# Patient Record
Sex: FEMALE | Race: BLACK OR AFRICAN AMERICAN | Employment: FULL TIME | URBAN - METROPOLITAN AREA
[De-identification: names, ages, dates, MRNs, and addresses within clinical notes are randomized per-mention and may not be internally consistent; named-entity substitution may affect disease eponyms.]

---

## 2019-02-19 ENCOUNTER — OFFICE VISIT (OUTPATIENT)
Dept: ENDOCRINOLOGY | Age: 34
End: 2019-02-19

## 2019-02-19 VITALS
HEIGHT: 62 IN | RESPIRATION RATE: 18 BRPM | TEMPERATURE: 96.6 F | BODY MASS INDEX: 38.64 KG/M2 | SYSTOLIC BLOOD PRESSURE: 151 MMHG | HEART RATE: 80 BPM | WEIGHT: 210 LBS | OXYGEN SATURATION: 96 % | DIASTOLIC BLOOD PRESSURE: 99 MMHG

## 2019-02-19 DIAGNOSIS — R63.5 WEIGHT GAIN: ICD-10-CM

## 2019-02-19 DIAGNOSIS — R73.03 PRE-DIABETES: ICD-10-CM

## 2019-02-19 DIAGNOSIS — E66.09 CLASS 2 OBESITY DUE TO EXCESS CALORIES WITHOUT SERIOUS COMORBIDITY WITH BODY MASS INDEX (BMI) OF 38.0 TO 38.9 IN ADULT: Primary | ICD-10-CM

## 2019-02-19 LAB — HBA1C MFR BLD HPLC: 5.8 %

## 2019-02-19 RX ORDER — AMLODIPINE BESYLATE 5 MG/1
5 TABLET ORAL DAILY
COMMUNITY

## 2019-02-19 RX ORDER — CITALOPRAM 10 MG/1
TABLET ORAL DAILY
COMMUNITY

## 2019-02-19 RX ORDER — ALPRAZOLAM 0.25 MG/1
TABLET ORAL
COMMUNITY

## 2019-02-19 NOTE — PATIENT INSTRUCTIONS
-------------------------------------------------------------------------------------------- Refills    -    please call your pharmacy and have them send us a refill request 
 
Results  -  allow up to a week for lab results to be processed and reviewed. Phone calls  -  Allow upto 24 hrs. for non-urgent calls to be retained Prior authorization - It may take up to 2 weeks to process, depending on your insurance Forms  -  FMLA, DMV, patient assistance, etc. will take up to 2 weeks to process Cancellations - please notify the office in advance if you cannot keep your appointment Samples  - will only be dispensed at visits as supply is limited If you are having a medical emergency call 911 
 
--------------------------------------------------------------------------------------------

## 2019-02-19 NOTE — PROGRESS NOTES
[facility] Everton Alvarez MD 
 
  
Patient Information Date:2/19/2019 Name : Jerrilyn Essex 35 y.o.    
YOB: 1985 Referred by: Perla Combs MD  
 
 
History of Present Illness: Jerrilyn Essex is a 35 y.o. female  PCOS She was seen for gestational diabetes management in the past,Delivered healthy baby in April 2015. She has been gradually gaining weight, had ultrasound of the ovaries which showed cysts and was suspected to have PCO S. 
Her cycles have always been regular in the past, no problems with fertility, now she is on Mirena, denies any excess hair growth, hair loss, acne. Complains of tiredness Family history of PCO S 
 
Grandmother had diabetes No testosterone exposure Past Medical History:  
Diagnosis Date  Essential (primary) hypertension  GDM (gestational diabetes mellitus) diet controlled  Hyperglycemia  Pre-eclampsia 3/30/2015 No Known Allergies Current Outpatient Medications Medication Sig Dispense Refill  amLODIPine (NORVASC) 5 mg tablet Take 5 mg by mouth daily.  citalopram (CELEXA) 10 mg tablet Take  by mouth daily.  ALPRAZolam (XANAX) 0.25 mg tablet Take  by mouth nightly as needed for Anxiety.  acetaminophen (TYLENOL) 325 mg tablet Take 325 mg by mouth every four (4) hours as needed for Pain. Indications: PAIN    
 loratadine (CLARITIN) 10 mg tablet Take 10 mg by mouth as needed for Allergies.  HYDROcodone-acetaminophen (NORCO) 5-325 mg per tablet Take 2 Tabs by mouth every four (4) hours as needed. Max Daily Amount: 12 Tabs. 10 Tab 0  
 PRENATAL VIT #76/IRON,CARB/FA (PNV 29-1 PO) Take 2 tablets by mouth daily.  ranitidine (ZANTAC 75) 75 mg tablet Take 75 mg by mouth as needed for Indigestion.     
 glucose blood VI test strips (ONE TOUCH ULTRA TEST) strip Test blood glucose 6-8 times daily Dx Code: 648.80 300 strip 9  
  Lancets misc Test blood glucose 6-8 times daily Dx Code: 648.80 300 each 9  
 hydrocodone-acetaminophen (VICODIN) 5-500 mg per tablet Take 1 Tab by mouth every six (6) hours as needed for Pain. 10 Tab 0 Reviewed Family Hx,Social Hx Review of Systems: 
- Constitutional Symptoms: no fevers, chills, no weight loss - Eyes: no blurry vision no double vision - Cardiovascular: no chest pain or palpitations - Respiratory: no cough no shortness of breath 
- Gastrointestinal: no dysphagia no abdominal pain - Musculoskeletal: no joint pains no  weakness - Integumentary: no rashes - Neurological: no numbness, tingling, no headaches - Psychiatric: no depression no anxiety - Endocrine: no heat or cold intolerance, no polyuria or polydipsia Physical Examination: 
Visit Vitals BP (!) 168/110 (BP 1 Location: Right arm, BP Patient Position: Sitting) Pulse 80 Temp 96.6 °F (35.9 °C) (Oral) Resp 18 Ht 5' 2\" (1.575 m) Wt 210 lb (95.3 kg) SpO2 96% BMI 38.41 kg/m² - General: pleasant, no distress, good eye contact 
- HEENT: no exopthalmos, no periorbital edema, EOMI, no lid lag or stare 
- Neck: supple, no thyromegaly - Cardiovascular: regular, normal rate, normal S1 and S2, no murmurs - Respiratory: clear to auscultation bilaterally - Gastrointestinal: Soft, nontender - Musculoskeletal: no proximal muscle weakness in upper or lower extremities - Integumentary: no edema,  
- Neurological: alert and oriented - Psychiatric: normal mood and affect Data Reviewed:  
 
[] Glucose records reviewed. [] See glucose records for details (to be scanned). [x] A1C [] Reviewed labs Assessment/Plan:  
 
Obesity History of gestational diabetes History of prediabetes Insulin resistance She does not meet all the criteria for PCOS, no hyperandrogenic signs or symptoms, no oligomenorrhea even prior to Mirena, now she has [place] However she has the risk factors for PCOS She needs behavioral lifestyle modification which is the most important thing. Low carbohydrate diet, exercise Check TSH along with testosterone A1c 5.8 A1c check every 6 months patient verbalized understanding Hypertension: On amlodipine Did not take the medicine today Home blood pressure monitoring is good Thank you for allowing me to participate in the care of this patient. Sterling Chan MD 
 
 
Patient verbalized understanding

## 2019-02-19 NOTE — PROGRESS NOTES
1. Have you been to the ER, urgent care clinic since your last visit? No  Hospitalized since your last visit? No 
 
2. Have you seen or consulted any other health care providers outside of the [address] [place] [address] since your last visit? Include any pap smears or colon screening. No  
 
Wt Readings from Last 3 Encounters:  
02/19/19 210 lb (95.3 kg) 03/28/15 205 lb (93 kg) 01/28/15 201 lb 12.8 oz (91.5 kg) Temp Readings from Last 3 Encounters:  
02/19/19 96.6 °F (35.9 °C) (Oral) 04/03/15 97.8 °F (36.6 °C)  
03/28/15 98 °F (36.7 °C) BP Readings from Last 3 Encounters:  
02/19/19 (!) 168/110  
04/03/15 131/80  
03/28/15 120/77 Pulse Readings from Last 3 Encounters:  
02/19/19 80  
04/03/15 79  
03/28/15 83

## 2019-02-22 LAB
TESTOST SERPL-MCNC: 27.9 NG/DL (ref 10–55)
TSH SERPL DL<=0.005 MIU/L-ACNC: 1.28 UIU/ML (ref 0.45–4.5)